# Patient Record
Sex: FEMALE | URBAN - METROPOLITAN AREA
[De-identification: names, ages, dates, MRNs, and addresses within clinical notes are randomized per-mention and may not be internally consistent; named-entity substitution may affect disease eponyms.]

---

## 2022-09-13 ENCOUNTER — OFFICE VISIT (OUTPATIENT)
Dept: URBAN - METROPOLITAN AREA CLINIC 60 | Facility: CLINIC | Age: 47
End: 2022-09-13
Payer: MEDICAID

## 2022-09-13 DIAGNOSIS — H53.002 UNSPECIFIED AMBLYOPIA, LEFT EYE: Primary | ICD-10-CM

## 2022-09-13 DIAGNOSIS — Z98.890 OTHER SPECIFIED POSTPROCEDURAL STATES: ICD-10-CM

## 2022-09-13 PROCEDURE — 92004 COMPRE OPH EXAM NEW PT 1/>: CPT | Performed by: OPTOMETRIST

## 2022-09-13 ASSESSMENT — VISUAL ACUITY
OS: 20/30
OD: 20/20

## 2022-09-13 ASSESSMENT — INTRAOCULAR PRESSURE
OD: 12
OS: 13

## 2022-09-13 NOTE — IMPRESSION/PLAN
Impression: Unspecified amblyopia, left eye: H53.002. Plan: Possible mild corneal distortion s/p Lasik OS. All other structures normal. Per patient vision in OS has always been worse than OD.